# Patient Record
Sex: FEMALE | URBAN - METROPOLITAN AREA
[De-identification: names, ages, dates, MRNs, and addresses within clinical notes are randomized per-mention and may not be internally consistent; named-entity substitution may affect disease eponyms.]

---

## 2023-12-11 ENCOUNTER — TELEPHONE (OUTPATIENT)
Dept: FAMILY MEDICINE CLINIC | Facility: CLINIC | Age: 58
End: 2023-12-11

## 2023-12-11 NOTE — TELEPHONE ENCOUNTER
Patient's  called requesting an appointment for patient.  Patient is a previous patient of Dr. Tipton but has not been seen since 2019.  He is requesting that we check with you to see if it is ok to schedule an appointment for patient to reestablish care.

## 2023-12-12 NOTE — TELEPHONE ENCOUNTER
Left msg informing that Dr. Tipton approved new patient appt.  OK to transfer to office when they call back.